# Patient Record
Sex: FEMALE | Race: WHITE | NOT HISPANIC OR LATINO | Employment: UNEMPLOYED | URBAN - METROPOLITAN AREA
[De-identification: names, ages, dates, MRNs, and addresses within clinical notes are randomized per-mention and may not be internally consistent; named-entity substitution may affect disease eponyms.]

---

## 2023-07-25 ENCOUNTER — HOSPITAL ENCOUNTER (EMERGENCY)
Facility: MEDICAL CENTER | Age: 53
End: 2023-07-25
Attending: EMERGENCY MEDICINE

## 2023-07-25 VITALS
OXYGEN SATURATION: 98 % | DIASTOLIC BLOOD PRESSURE: 74 MMHG | TEMPERATURE: 96.5 F | HEART RATE: 82 BPM | SYSTOLIC BLOOD PRESSURE: 109 MMHG | WEIGHT: 156.09 LBS | RESPIRATION RATE: 16 BRPM

## 2023-07-25 DIAGNOSIS — M54.50 LUMBAR BACK PAIN: ICD-10-CM

## 2023-07-25 PROCEDURE — 99282 EMERGENCY DEPT VISIT SF MDM: CPT | Mod: EDC

## 2023-07-25 RX ORDER — NAPROXEN 500 MG/1
500 TABLET ORAL 2 TIMES DAILY WITH MEALS
Qty: 60 TABLET | Refills: 0 | Status: SHIPPED | OUTPATIENT
Start: 2023-07-25

## 2023-07-25 RX ORDER — NAPROXEN 500 MG/1
500 TABLET ORAL ONCE
Status: DISCONTINUED | OUTPATIENT
Start: 2023-07-25 | End: 2023-07-25 | Stop reason: HOSPADM

## 2023-07-25 RX ORDER — CYCLOBENZAPRINE HCL 10 MG
10 TABLET ORAL ONCE
Status: DISCONTINUED | OUTPATIENT
Start: 2023-07-25 | End: 2023-07-25 | Stop reason: HOSPADM

## 2023-07-25 RX ORDER — CYCLOBENZAPRINE HCL 10 MG
10 TABLET ORAL EVERY 6 HOURS PRN
Qty: 12 TABLET | Refills: 0 | Status: SHIPPED | OUTPATIENT
Start: 2023-07-25

## 2023-07-25 ASSESSMENT — LIFESTYLE VARIABLES
DO YOU DRINK ALCOHOL: YES
DOES PATIENT WANT TO STOP DRINKING: NO

## 2023-07-25 NOTE — ED PROVIDER NOTES
"ED Provider Note    CHIEF COMPLAINT  Chief Complaint   Patient presents with    Leg Pain     Bilateral leg pain x 3 days. Prior lower back pain. Denies trauma.      EXTERNAL RECORDS REVIEWED  Review of records shows no recent or pertinent findings.     HPI/ROS  LIMITATION TO HISTORY   Select: : None  OUTSIDE HISTORIAN(S):  None    Randa Larry is a 52 y.o. female with a history of chronic back pain who presents to the Emergency Department with worsening leg pain onset 3 days ago. She reports bilateral leg pain with associated numbness, tingling, and intermittent cramping in both of the legs that worsened today, prompting her to come to the ED. Patient describes her pain as originating in her lower back, wrapping around and down her legs. She is not currently taking any pain medication, stating that she can not afford them. She reports difficulty walking, and decreased sleep secondary to pain. She states that she \"works through the pain,\" and has only been seen for this in the [place] ED after being unable to walk when getting off a plane. She denies any bowel or bladder incontinence, fever, recent falls, or trauma. She has no history of IV-drug use. Patient says she is currently in a homeless shelter and has been homeless for 7 months. She reports avoiding seeing medical professionals for help with her pain.     PAST MEDICAL HISTORY  History reviewed. No pertinent past medical history.     SURGICAL HISTORY  History reviewed. No pertinent surgical history.     FAMILY HISTORY  History reviewed. No pertinent family history.    SOCIAL HISTORY   reports that she does not drink alcohol and does not use drugs.    CURRENT MEDICATIONS  Discharge Medication List as of 7/25/2023 10:26 AM          ALLERGIES  Demerol hcl [meperidine]    PHYSICAL EXAM  /74   Pulse 82   Temp 35.8 °C (96.5 °F) (Temporal)   Resp 16   Wt 70.8 kg (156 lb 1.4 oz)   SpO2 98%    Constitutional: Nontoxic appearing. Alert in no " apparent distress.  HENT: Normocephalic, Atraumatic.  Moist mucous membranes.    Neck: Supple, full range of motion  Musculoskeletal: No significant midline lumbar tenderness. Atraumatic. No obvious deformities noted.   Skin: Warm, Dry.  No erythema, No rash.   Neurologic: Negative straight leg raise test. Bilateral strength 5/5 in lower extremities. Sensation intact. Alert and oriented x3. Moving all extremities spontaneously without focal deficits.  Psychiatric: Affect normal, Mood normal, Appears appropriate and not intoxicated.     DIAGNOSTIC STUDIES    Labs Reviewed   URINALYSIS,CULTURE IF INDICATED        COURSE & MEDICAL DECISION MAKING  9:43 AM - Patient seen and examined at bedside. . Discussed plan of care, including the possibility of treating her pain with medications today and contacting social work. Patient agrees to the plan of care. The patient will be medicated with Flexeril 10 mg and Naproxen 500 mg. Ordered for UA culture if indicated to evaluate her symptoms.      10:24 AM - Nursing staff has informed me that the patient has decided to elope at this time, and has left the ED.    ED Observation Status? No; Patient does not meet criteria for ED Observation.     INITIAL ASSESSMENT, COURSE AND PLAN  Care Narrative: Homeless patient who presents with ongoing chronic lower back pain.  She is afebrile with normal vital signs on arrival.  She has no focal neurologic deficits on exam.  No red flag signs or symptoms concerning for cauda equina syndrome or epidural abscess.  No significant midline pain or history of recent trauma.  Plan to treat symptomatically for musculoskeletal pain and check urinalysis however the patient eloped prior to this being performed.      ADDITIONAL PROBLEM LIST  Problem #1: Chronic lower back pain -plan to discharge home with symptomatic care however patient eloped prior to this      DISPOSITION AND DISCUSSIONS  Discussion of management with other [facility] or appropriate source(s):   Social work to help with providing medications    Escalation of care considered, and ultimately not performed:diagnostic imaging    Barriers to care at this time, including but not limited to: Patient does not have established PCP, Patient is homeless, Patient lacks transportation , Patient does not have insurance, Patient had difficult affording medications, and Patient lacks financial resources.     Decision tools and prescription drugs considered including, but not limited to: Pain Medications antiinflammatories and muscle relaxants should be sufficient .     The patient will return for new or worsening symptoms and is stable at the time of discharge.    DISPOSITION:  Patient will be discharged home in stable condition.    FOLLOW UP:  No follow-up provider specified.    OUTPATIENT MEDICATIONS:  Discharge Medication List as of 7/25/2023 10:26 AM        START taking these medications    Details   cyclobenzaprine (FLEXERIL) 10 mg Tab Take 1 Tablet by mouth every 6 hours as needed for Muscle Spasms., Disp-12 Tablet, R-0, Normal      naproxen (NAPROSYN) 500 MG Tab Take 1 Tablet by mouth 2 times a day with meals., Disp-60 Tablet, R-0, Normal              FINAL DIAGNOSIS  1. Lumbar back pain        The note accurately reflects work and decisions made by me.  Anita Christianson M.D.  7/25/2023  1:52 PM     Jean NELSON (Mireya), am scribing for, and in the presence of, Anita Christianson M.D..    Electronically signed by: Jean Walton (Mireya), 7/25/2023    Anita NELSON M.D. personally performed the services described in this documentation, as scribed by Jean Walton in my presence, and it is both accurate and complete.

## 2023-07-25 NOTE — ED NOTES
Pt was up to restroom to urinate for UA sample however pt did not return to room and does not appear to be in department or surrounding area. There are no belongings left in room. This RN attempted to call phone number on file, no answer. ERP to be notified and pt to be dismissed from system.

## 2023-07-25 NOTE — ED TRIAGE NOTES
Chief Complaint   Patient presents with    Leg Pain     Bilateral leg pain x 3 days. Prior lower back pain. Denies trauma.         /74   Pulse 82   Temp 35.8 °C (96.5 °F) (Temporal)   Resp 16   SpO2 98%

## 2023-07-25 NOTE — ED NOTES
First interaction with patient. Assumed care at this time.  Pt reports bilateral leg pain and low back pain for the last 3 days. Pt reports weakness in both legs. Pt denies any trauma to back or legs. Pt denies nausea, vomiting, and fevers. Pt awake and alert. Skin PWD. No signs of trauma noted to legs or low back. Distal CMS intact bilaterally.  Pt given gown.  Patient's NPO status explained.  Call light provided.  Chart up for ERP.